# Patient Record
Sex: MALE | Race: OTHER | HISPANIC OR LATINO | Employment: UNEMPLOYED | URBAN - METROPOLITAN AREA
[De-identification: names, ages, dates, MRNs, and addresses within clinical notes are randomized per-mention and may not be internally consistent; named-entity substitution may affect disease eponyms.]

---

## 2022-10-07 ENCOUNTER — HOSPITAL ENCOUNTER (EMERGENCY)
Facility: HOSPITAL | Age: 22
Discharge: HOME | End: 2022-10-07
Attending: EMERGENCY MEDICINE

## 2022-10-07 VITALS
OXYGEN SATURATION: 98 % | SYSTOLIC BLOOD PRESSURE: 140 MMHG | DIASTOLIC BLOOD PRESSURE: 85 MMHG | HEART RATE: 66 BPM | RESPIRATION RATE: 20 BRPM | TEMPERATURE: 98 F

## 2022-10-07 DIAGNOSIS — N20.0 NEPHROLITHIASIS: Primary | ICD-10-CM

## 2022-10-07 LAB
ANION GAP SERPL CALC-SCNC: 10 MMOL/L (ref 8–16)
BASOPHILS # BLD AUTO: 0.04 K/UL (ref 0–0.2)
BASOPHILS NFR BLD: 0.3 % (ref 0–1.9)
BILIRUB UR QL STRIP: NEGATIVE
BUN SERPL-MCNC: 12 MG/DL (ref 6–20)
CALCIUM SERPL-MCNC: 9.6 MG/DL (ref 8.7–10.5)
CHLORIDE SERPL-SCNC: 106 MMOL/L (ref 95–110)
CLARITY UR: CLEAR
CO2 SERPL-SCNC: 25 MMOL/L (ref 23–29)
COLOR UR: YELLOW
CREAT SERPL-MCNC: 0.9 MG/DL (ref 0.5–1.4)
DIFFERENTIAL METHOD: ABNORMAL
EOSINOPHIL # BLD AUTO: 0.2 K/UL (ref 0–0.5)
EOSINOPHIL NFR BLD: 1.8 % (ref 0–8)
ERYTHROCYTE [DISTWIDTH] IN BLOOD BY AUTOMATED COUNT: 13.4 % (ref 11.5–14.5)
EST. GFR  (NO RACE VARIABLE): >60 ML/MIN/1.73 M^2
GLUCOSE SERPL-MCNC: 109 MG/DL (ref 70–110)
GLUCOSE UR QL STRIP: NEGATIVE
HCT VFR BLD AUTO: 46 % (ref 40–54)
HGB BLD-MCNC: 15.1 G/DL (ref 14–18)
HGB UR QL STRIP: ABNORMAL
IMM GRANULOCYTES # BLD AUTO: 0.05 K/UL (ref 0–0.04)
IMM GRANULOCYTES NFR BLD AUTO: 0.4 % (ref 0–0.5)
KETONES UR QL STRIP: NEGATIVE
LEUKOCYTE ESTERASE UR QL STRIP: NEGATIVE
LYMPHOCYTES # BLD AUTO: 2 K/UL (ref 1–4.8)
LYMPHOCYTES NFR BLD: 15 % (ref 18–48)
MCH RBC QN AUTO: 29.6 PG (ref 27–31)
MCHC RBC AUTO-ENTMCNC: 32.8 G/DL (ref 32–36)
MCV RBC AUTO: 90 FL (ref 82–98)
MONOCYTES # BLD AUTO: 0.7 K/UL (ref 0.3–1)
MONOCYTES NFR BLD: 5.2 % (ref 4–15)
NEUTROPHILS # BLD AUTO: 10.3 K/UL (ref 1.8–7.7)
NEUTROPHILS NFR BLD: 77.3 % (ref 38–73)
NITRITE UR QL STRIP: NEGATIVE
NRBC BLD-RTO: 0 /100 WBC
PH UR STRIP: 6 [PH] (ref 5–8)
PLATELET # BLD AUTO: 366 K/UL (ref 150–450)
PMV BLD AUTO: 10.1 FL (ref 9.2–12.9)
POTASSIUM SERPL-SCNC: 4 MMOL/L (ref 3.5–5.1)
PROT UR QL STRIP: NEGATIVE
RBC # BLD AUTO: 5.1 M/UL (ref 4.6–6.2)
SODIUM SERPL-SCNC: 141 MMOL/L (ref 136–145)
SP GR UR STRIP: 1.02 (ref 1–1.03)
URN SPEC COLLECT METH UR: ABNORMAL
UROBILINOGEN UR STRIP-ACNC: NEGATIVE EU/DL
WBC # BLD AUTO: 13.28 K/UL (ref 3.9–12.7)

## 2022-10-07 PROCEDURE — 85025 COMPLETE CBC W/AUTO DIFF WBC: CPT | Performed by: PHYSICIAN ASSISTANT

## 2022-10-07 PROCEDURE — 63600175 PHARM REV CODE 636 W HCPCS: Performed by: PHYSICIAN ASSISTANT

## 2022-10-07 PROCEDURE — 80048 BASIC METABOLIC PNL TOTAL CA: CPT | Performed by: PHYSICIAN ASSISTANT

## 2022-10-07 PROCEDURE — 96372 THER/PROPH/DIAG INJ SC/IM: CPT | Performed by: PHYSICIAN ASSISTANT

## 2022-10-07 PROCEDURE — 81003 URINALYSIS AUTO W/O SCOPE: CPT | Performed by: PHYSICIAN ASSISTANT

## 2022-10-07 PROCEDURE — 99285 EMERGENCY DEPT VISIT HI MDM: CPT | Mod: 25

## 2022-10-07 RX ORDER — OXYCODONE AND ACETAMINOPHEN 5; 325 MG/1; MG/1
1 TABLET ORAL EVERY 4 HOURS PRN
Qty: 18 TABLET | Refills: 0 | Status: SHIPPED | OUTPATIENT
Start: 2022-10-07

## 2022-10-07 RX ORDER — ONDANSETRON 4 MG/1
4 TABLET, ORALLY DISINTEGRATING ORAL EVERY 6 HOURS PRN
Qty: 12 TABLET | Refills: 0 | Status: SHIPPED | OUTPATIENT
Start: 2022-10-07

## 2022-10-07 RX ORDER — KETOROLAC TROMETHAMINE 30 MG/ML
30 INJECTION, SOLUTION INTRAMUSCULAR; INTRAVENOUS
Status: COMPLETED | OUTPATIENT
Start: 2022-10-07 | End: 2022-10-07

## 2022-10-07 RX ORDER — IBUPROFEN 800 MG/1
800 TABLET ORAL EVERY 6 HOURS PRN
Qty: 20 TABLET | Refills: 0 | Status: SHIPPED | OUTPATIENT
Start: 2022-10-07

## 2022-10-07 RX ADMIN — KETOROLAC TROMETHAMINE 30 MG: 30 INJECTION, SOLUTION INTRAMUSCULAR at 10:10

## 2022-10-07 NOTE — DISCHARGE INSTRUCTIONS
terminar el curso completo de antibióticos. [place] la medicación según lo prescrito. [place] Percocet solo para el dolor irruptivo. No trabaje ni conduzca con izaiah medicamento. Use un colador cada vez que orine.      Es probable que elimine el cálculo por hernández cuenta, keron si tiene problemas, deberá realizar un seguimiento con el urólogo que se le proporcionó.      Regrese a la alie de emergencias de inmediato si tiene fiebre o si tiene un dolor significativo que no se shannan con los medicamentos que le estoy proporcionando.       finish full course of antibiotics.  Take medication as prescribed.  Take Percocet only for breakthrough pain.  Do not work or drive with this medicine.  Use a strainer every time you urinate.      You will likely pass the stone on your own but if you are having trouble you will need to follow-up with urologist provided to you.      Return to the emergency room immediately if you develop a fever or if you have significant pain that is not alleviated by the  Medications Im  providing you

## 2022-10-07 NOTE — ED PROVIDER NOTES
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  Patient reports sudden onset left-sided back discomfort that radiated to the abdomen.  Reports that the pain has been intermittent in nature.  Denies any paresthesia weakness.  No pain radiating to the lower extremities.  No injury, trauma or fall.  Patient states that he was remodeling a but does not recall any injury.  Denies any nausea, vomiting or diarrhea.  Denies any fevers or chills.  No prior back surgery.  Took ibuprofen earlier today. no history of kidney stones.    The history is provided by the patient. A  was used (Allison Simpson- nursing staff).   Review of patient's allergies indicates:  No Known Allergies  No past medical history on file.  No past surgical history on file.  No family history on file.     Review of Systems   Constitutional:  Negative for chills and fever.   HENT:  Negative for congestion.    Eyes:  Negative for visual disturbance.   Respiratory:  Negative for shortness of breath.    Cardiovascular:  Negative for chest pain.   Gastrointestinal:  Negative for nausea and vomiting.   Genitourinary:  Positive for flank pain. Negative for dysuria.   Musculoskeletal:  Positive for back pain. Negative for myalgias.   Skin:  Negative for rash.   Allergic/Immunologic: Negative for immunocompromised state.   Neurological:  Negative for weakness and numbness.   Hematological:  Does not bruise/bleed easily.   Psychiatric/Behavioral:  Negative for confusion.      Physical Exam     Initial Vitals [10/07/22 0947]   BP Pulse Resp Temp SpO2   (!) 165/94 66 16 98.2 °F (36.8 °C) 98 %      MAP       --         Physical Exam    Vitals  reviewed.  Constitutional: He appears well-developed and well-nourished. He is not diaphoretic. No distress.   HENT:   Head: Normocephalic and atraumatic.   Eyes: Conjunctivae and EOM are normal.   Neck: Neck supple.   Pulmonary/Chest: No respiratory distress.   Abdominal: Abdomen is soft. There is abdominal tenderness (left flank).   No right CVA tenderness.  No left CVA tenderness.   Musculoskeletal:         General: Normal range of motion.      Cervical back: Normal and neck supple.      Thoracic back: Normal.      Lumbar back: Normal.     Neurological: He is alert and oriented to person, place, and time.   Skin: Skin is warm.       ED Course   Procedures  Labs Reviewed   URINALYSIS, REFLEX TO URINE CULTURE - Abnormal; Notable for the following components:       Result Value    Occult Blood UA Trace (*)     All other components within normal limits    Narrative:     Specimen Source->Urine   CBC W/ AUTO DIFFERENTIAL - Abnormal; Notable for the following components:    WBC 13.28 (*)     Gran # (ANC) 10.3 (*)     Immature Grans (Abs) 0.05 (*)     Gran % 77.3 (*)     Lymph % 15.0 (*)     All other components within normal limits   BASIC METABOLIC PANEL          Imaging Results              CT Renal Stone Study ABD Pelvis WO (Final result)  Result time 10/07/22 12:05:25      Final result by Navneet Ortiz Jr., MD (10/07/22 12:05:25)                   Impression:      5 mm left UVJ stone with associated mild obstructive and inflammatory changes of the left renal collecting system and kidney.    Hepatic steatosis      Electronically signed by: Navneet Turner Jr  Date:    10/07/2022  Time:    12:05               Narrative:    EXAMINATION:  CT RENAL STONE STUDY ABD PELVIS WO    CLINICAL HISTORY:  Flank pain, kidney stone suspected;Nephrolithiasis, uncomplicated;    TECHNIQUE:  Low dose axial images, sagittal and coronal reformations were obtained from the lung bases to the pubic symphysis.  Contrast was not  administered.    COMPARISON:  None    FINDINGS:  Lung Bases: Unremarkable.    Kidneys/ Ureters: There is a 5 x 5 mm left UVJ stone with mild associated hydro ureteral nephrosis and inflammatory stranding around the distal left pelvic ureter.  Low-density right renal lesion measuring approximately 1.5 cm likely a cyst.  No right renal stones or hydronephrosis.    Liver: Enlarged and fatty infiltrated.    Gallbladder: No calcified gallstones.    Bile Ducts: No evidence of dilated ducts.    Pancreas: No mass or peripancreatic fat stranding.    Spleen: Unremarkable.    Adrenals: Unremarkable.    Pelvic organs: Unremarkable.    GI Tract/Mesentery: No evidence of bowel obstruction or inflammation.    Retroperitoneum: No significant adenopathy.    Vasculature: No significant atherosclerosis or aneurysm.    Bones: Unremarkable.                                       Medications   ketorolac injection 30 mg (30 mg Intramuscular Given 10/7/22 1008)           APC / Resident Notes:   Patient seen in the ER promptly upon arrival.  He is afebrile, no his.  Physical examination reveals tenderness on palpation to left flank left lower abdomen.  No tenderness to the back.  No spinous process tenderness on exam.  Laboratory studies obtained.  Urinalysis shows occult blood.    Findings concerning for possible nephrolithiasis.  Will obtain CT of the abdomen and pelvis.  Lab work was found to be unremarkable.      ED Course as of 10/07/22 2033   Fri Oct 07, 2022   1251 Will prescribe home on Percocet, ibuprofen and Zofran to use as directed.  Will advised to follow-up with urology if unable to pass the stone.  Given strict return precautions ED which patient is agreeable to.  He is otherwise stable for discharge and close follow-up at this time.    Disclaimer: This note has been generated using voice-recognition software. There may be typographical errors that have been missed during proof-reading.     [AJ]   1251 CT concerning for  calculus 5 mm at the UVJ.    Lab work was found to be unremarkable.  Mild leukocytosis of 13,000 .  Chemistries found to be unremarkable.  No evidence of UTI [AJ]      ED Course User Index  [AJ] Cristy Rajan PA-C                 Clinical Impression:   Final diagnoses:  [N20.0] Nephrolithiasis (Primary)      ED Disposition Condition    Discharge Stable          ED Prescriptions       Medication Sig Dispense Start Date End Date Auth. Provider    oxyCODONE-acetaminophen (PERCOCET) 5-325 mg per tablet Take 1 tablet by mouth every 4 (four) hours as needed for Pain. 18 tablet 10/7/2022 -- Cristy Rajan PA-C    ibuprofen (ADVIL,MOTRIN) 800 MG tablet Take 1 tablet (800 mg total) by mouth every 6 (six) hours as needed for Pain. 20 tablet 10/7/2022 -- Cristy Rajan PA-C    ondansetron (ZOFRAN-ODT) 4 MG TbDL Take 1 tablet (4 mg total) by mouth every 6 (six) hours as needed. 12 tablet 10/7/2022 -- Cristy Rajan PA-C          Follow-up Information       Follow up With Specialties Details Why Contact Info Additional Information    [facility]   [address], [address]  [facility] [address] [phone] Please park in [address] or [address] and use [address]. Take Medical Office Building elevators.             Cristy Rajan PA-C  10/07/22 2033

## 2023-03-24 ENCOUNTER — HOSPITAL ENCOUNTER (EMERGENCY)
Facility: HOSPITAL | Age: 23
Discharge: HOME | End: 2023-03-24
Attending: EMERGENCY MEDICINE

## 2023-03-24 VITALS
TEMPERATURE: 98 F | OXYGEN SATURATION: 98 % | WEIGHT: 219.56 LBS | RESPIRATION RATE: 18 BRPM | SYSTOLIC BLOOD PRESSURE: 148 MMHG | HEART RATE: 94 BPM | HEIGHT: 65 IN | BODY MASS INDEX: 36.58 KG/M2 | DIASTOLIC BLOOD PRESSURE: 83 MMHG

## 2023-03-24 DIAGNOSIS — S80.02XA CONTUSION OF LEFT KNEE, INITIAL ENCOUNTER: Primary | ICD-10-CM

## 2023-03-24 DIAGNOSIS — M25.562 LEFT KNEE PAIN: ICD-10-CM

## 2023-03-24 PROCEDURE — 99283 EMERGENCY DEPT VISIT LOW MDM: CPT

## 2023-03-24 RX ORDER — NAPROXEN 500 MG/1
500 TABLET ORAL 2 TIMES DAILY WITH MEALS
Qty: 30 TABLET | Refills: 0 | Status: SHIPPED | OUTPATIENT
Start: 2023-03-24

## 2023-03-24 NOTE — DISCHARGE INSTRUCTIONS

## 2023-03-24 NOTE — ED PROVIDER NOTES
Encounter Date: 3/24/2023       History     Chief Complaint   Patient presents with    Knee Pain     Knee pain x 2 days after mechanical fall. Last tylenol was last night.      23-year-old male presents to ED with concern of right-sided knee pain.  Patient states he was running yesterday when he stumbled and fell, causing him to twist his left knee.  Since fall he has been unable to weight bear onto left lower extremity without significant amount of pain that he describes as sharp, nonradiating, rated as severity 8/10.  No numbness or focal weakness.  No other reported injuries.  No other acute complaints at this time.    The history is provided by the patient. The history is limited by a language barrier. A  was used ([employer]:  [id number]).   Review of patient's allergies indicates:  No Known Allergies  No past medical history on file.  No past surgical history on file.  No family history on file.     Review of Systems   Musculoskeletal:  Positive for arthralgias and gait problem. Negative for back pain and neck pain.   Skin:  Negative for wound.   Neurological:  Negative for weakness and numbness.     Physical Exam     Initial Vitals [03/24/23 1216]   BP Pulse Resp Temp SpO2   (!) 148/83 94 18 97.6 °F (36.4 °C) 98 %      MAP       --         Physical Exam    Nursing note and vitals reviewed.  Constitutional: Vital signs are normal. He appears well-developed and well-nourished. He is cooperative. He does not have a sickly appearance. He does not appear ill. No distress.   HENT:   Head: Normocephalic and atraumatic.   Eyes: EOM are normal.   Neck:   Normal range of motion.  Musculoskeletal:      Cervical back: Normal range of motion.      Comments: Left knee tenderness near medial joint line.  No obvious physical or palpable deformities.  No significant swelling or large effusion.  No overlying skin changes or wounds.  No posterior calf tenderness.  Full ROM with no crepitus.  Distal sensation intact.   DP pulse intact.     Neurological: He is alert and oriented to person, place, and time. GCS eye subscore is 4. GCS verbal subscore is 5. GCS motor subscore is 6.   Psychiatric: He has a normal mood and affect. His speech is normal and behavior is normal.       ED Course   Procedures  Labs Reviewed - No data to display       Imaging Results              X-Ray Knee 3 View Left (Final result)  Result time 03/24/23 13:34:34      Final result by Vasiliy Medina III, MD (03/24/23 13:34:34)                   Impression:      Small joint effusion.      Electronically signed by: Vasiliy Medina MD  Date:    03/24/2023  Time:    13:34               Narrative:    EXAMINATION:  XR KNEE 3 VIEW LEFT    CLINICAL HISTORY:  Pain in left knee    FINDINGS:  Three views knee left: There is no fracture dislocation or bone destruction seen.  There is a small joint effusion.                                       Medications - No data to display  Medical Decision Making:   Initial Assessment:   Patient presents with concern of left-sided knee pain since fall that occurred yesterday while running.  No numbness or focal weakness.  No other reported injuries.  Afebrile with vitals grossly unremarkable, noting mildly hypertensive.  Patient otherwise resting on bed and in no apparent distress.  Tenderness over medial aspect of left knee with no obvious physical or palpable deformities.  Neurovascular intact.  Differential Diagnosis:   Strain, sprain, dislocation, fracture, ligament injury, meniscus injury  ED Management:  X-ray left knee    Images of left knee showing small joint effusion but no acute bony abnormalities or dislocation.  Results discussed with patient.  Will plan to continue with conservative care.  He will continue his home knee brace as instructed.  Supplied with crutches in ED along with prescription for Motrin.  Encouraged activities movements only as tolerated with close PCP follow-up.  ED return precautions were discussed.   Patient states his understanding and agrees with plan.                        Clinical Impression:   Final diagnoses:  [M25.562] Left knee pain  [S80.02XA] Contusion of left knee, initial encounter (Primary)        ED Disposition Condition    Discharge Stable          ED Prescriptions       Medication Sig Dispense Start Date End Date Auth. Provider    naproxen (NAPROSYN) 500 MG tablet Take 1 tablet (500 mg total) by mouth 2 (two) times daily with meals. 30 tablet 3/24/2023 -- Juma Ricardo PA-C          Follow-up Information       Follow up With Specialties Details Why Contact Info    Your Doctor                 Juma Ricardo PA-C  03/24/23 8072

## 2023-06-22 ENCOUNTER — HOSPITAL ENCOUNTER (EMERGENCY)
Facility: HOSPITAL | Age: 23
Discharge: HOME | End: 2023-06-22
Attending: EMERGENCY MEDICINE

## 2023-06-22 VITALS
OXYGEN SATURATION: 98 % | HEART RATE: 86 BPM | SYSTOLIC BLOOD PRESSURE: 130 MMHG | DIASTOLIC BLOOD PRESSURE: 63 MMHG | WEIGHT: 220 LBS | RESPIRATION RATE: 18 BRPM | BODY MASS INDEX: 37.1 KG/M2 | TEMPERATURE: 98 F

## 2023-06-22 DIAGNOSIS — T15.92XA FOREIGN BODY OF LEFT EYE, INITIAL ENCOUNTER: Primary | ICD-10-CM

## 2023-06-22 PROCEDURE — 65220 REMOVE FOREIGN BODY FROM EYE: CPT | Mod: LT

## 2023-06-22 PROCEDURE — 63600175 PHARM REV CODE 636 W HCPCS: Performed by: NURSE PRACTITIONER

## 2023-06-22 PROCEDURE — 99284 EMERGENCY DEPT VISIT MOD MDM: CPT

## 2023-06-22 PROCEDURE — 90471 IMMUNIZATION ADMIN: CPT | Performed by: NURSE PRACTITIONER

## 2023-06-22 PROCEDURE — 25000003 PHARM REV CODE 250: Performed by: NURSE PRACTITIONER

## 2023-06-22 PROCEDURE — 90715 TDAP VACCINE 7 YRS/> IM: CPT | Performed by: NURSE PRACTITIONER

## 2023-06-22 RX ORDER — ERYTHROMYCIN 5 MG/G
OINTMENT OPHTHALMIC
Qty: 1 G | Refills: 0 | Status: SHIPPED | OUTPATIENT
Start: 2023-06-22

## 2023-06-22 RX ORDER — ERYTHROMYCIN 5 MG/G
OINTMENT OPHTHALMIC
Status: COMPLETED | OUTPATIENT
Start: 2023-06-22 | End: 2023-06-22

## 2023-06-22 RX ORDER — PROPARACAINE HYDROCHLORIDE 5 MG/ML
1 SOLUTION/ DROPS OPHTHALMIC
Status: COMPLETED | OUTPATIENT
Start: 2023-06-22 | End: 2023-06-22

## 2023-06-22 RX ADMIN — FLUORESCEIN SODIUM 1 EACH: 1 STRIP OPHTHALMIC at 05:06

## 2023-06-22 RX ADMIN — ERYTHROMYCIN 1 INCH: 5 OINTMENT OPHTHALMIC at 06:06

## 2023-06-22 RX ADMIN — PROPARACAINE HYDROCHLORIDE 1 DROP: 5 SOLUTION/ DROPS OPHTHALMIC at 05:06

## 2023-06-22 RX ADMIN — TETANUS TOXOID, REDUCED DIPHTHERIA TOXOID AND ACELLULAR PERTUSSIS VACCINE, ADSORBED 0.5 ML: 5; 2.5; 8; 8; 2.5 SUSPENSION INTRAMUSCULAR at 06:06

## 2023-06-22 NOTE — Clinical Note
"Stevenson Sánchez (Lizandro) was seen and treated in our emergency department on 6/22/2023.  He may return to work on 06/25/2023.       If you have any questions or concerns, please don't hesitate to call.      Eugenie Mendez NP"

## 2023-06-22 NOTE — DISCHARGE INSTRUCTIONS

## 2023-06-22 NOTE — ED PROVIDER NOTES
Encounter Date: 6/22/2023       History     Chief Complaint   Patient presents with    Eye Pain     Pt state a piece of metal flew into his eye, pt states feels like sometime is in her eye. Pt states stabbing type pain to left eye.      23 yr old male presents to the ER with reports of a foreign body to the left eye that occurred yesterday when using a  at work. Pt states he has been having pain and irritation to the eye since. Not up to date on tetanus. No PMH reported.     The history is provided by the patient. The history is limited by a language barrier. A  was used.   Review of patient's allergies indicates:  No Known Allergies  No past medical history on file.  No past surgical history on file.  No family history on file.     Review of Systems   Eyes:  Positive for pain.        Foreign body left eye     Physical Exam     Initial Vitals [06/22/23 1537]   BP Pulse Resp Temp SpO2   130/63 86 18 98.3 °F (36.8 °C) 98 %      MAP       --         Physical Exam    Constitutional: He appears well-developed and well-nourished. He is not diaphoretic. No distress.   HENT:   Head: Normocephalic and atraumatic.   Right Ear: Hearing and tympanic membrane normal.   Left Ear: Hearing and tympanic membrane normal.   Nose: Nose normal.   Mouth/Throat: Uvula is midline, oropharynx is clear and moist and mucous membranes are normal.   Eyes: Lids are normal. Pupils are equal, round, and reactive to light. Foreign body present in the left eye. Left conjunctiva is injected. Right eye exhibits no nystagmus. Left eye exhibits no nystagmus.   Slit lamp exam:       The left eye shows foreign body and fluorescein uptake.       No open globe noted. EOMI.    Cardiovascular:  Normal rate.           Pulmonary/Chest: Effort normal and breath sounds normal. No respiratory distress. He has no wheezes. He has no rhonchi.   Abdominal: Abdomen is soft. There is no abdominal tenderness.   Musculoskeletal:         General:  Normal range of motion.      Cervical back: No rigidity.     Neurological: He is oriented to person, place, and time.   Skin: Skin is warm and dry. No rash noted.   Psychiatric: He has a normal mood and affect. His behavior is normal. Judgment and thought content normal.       ED Course   ED US Eye    Date/Time: 6/22/2023 6:21 PM  Performed by: Jed Wallis MD  Authorized by: Jed Wallis MD     Indication:  Other (foreign body)  Identified structures:  Left  Retinal contour:  Normal  Optic nerve sheath:  Normal  Other:  No retained foreign body noted.  Normal ocular ultrasound:  Left  Charge?:  Yes  Foreign Body    Date/Time: 6/22/2023 6:22 PM  Performed by: Eugenie Mendez NP  Authorized by: Jed Wallis MD   Body area: eye  Location details: left cornea    Anesthesia:  Local Anesthetic: proparacaine drops  Localization method: visualized and magnification  Removal mechanism: moist cotton swab, 27-gauge needle and ophthalmic anabel  Eye examined with fluorescein.  Corneal abrasion size: small  Corneal abrasion location: lateral  Dressing: antibiotic ointment  Depth: embedded  Complexity: complex  1 objects recovered.  Post-procedure assessment: foreign body removed  Patient tolerance: Patient tolerated the procedure well with no immediate complications    Labs Reviewed - No data to display       Imaging Results    None          Medications   proparacaine 0.5 % ophthalmic solution 1 drop (has no administration in time range)   fluorescein ophthalmic strip 1 each (has no administration in time range)   erythromycin 5 mg/gram (0.5 %) ophthalmic ointment (has no administration in time range)   Tdap (BOOSTRIX) vaccine injection 0.5 mL (has no administration in time range)     Medical Decision Making:   Differential Diagnosis:   Differential Diagnosis includes, but is not limited to:  Acute glaucoma, open globe, ocular foreign body, retinal detachment, vitreous hemorrhage, endophthalmitis, traumatic  injury, orbital fracture, corneal abrasion/ulcer, retinal vascular occlusion, optic neuritis, periorbital/orbital cellulitis, hyphema, hypopion, iritis, UV keratitis, subconjunctival hemorrhage, conjunctivitis, blepharitis, chalazion/hordeolum, benign vitreous floaters.            ED Course as of 06/22/23 1826   Thu Jun 22, 202322, 2023 1806 Foreign body noted to the outer corner of the left iris. This is consistent with piece of metal. Cotton tip applicator was used in which a small piece of black suspected metal was removed, however small part still noted to be embedded in the eye. Bur device was used with no success. 18 gauge needle then used with successful removal of foreign body.  [DT]   1808 Dr Wallis at the bedside for ultrasound.  [DT]   1821 There is no retained foreign body noted on ultrasound per Dr Wallis.  [DT]      ED Course User Index  [DT] Eugenie Mendez NP                 Clinical Impression:   Final diagnoses:  [T15.92XA] Foreign body of left eye, initial encounter (Primary)        ED Disposition Condition    Discharge Stable          ED Prescriptions       Medication Sig Dispense Start Date End Date Auth. Provider    erythromycin (ROMYCIN) ophthalmic ointment Place a 1/2 inch ribbon of ointment into the lower eyelid, left three times a day 1 g 6/22/2023 -- Eugenie Mendez NP          Follow-up Information       Follow up With Specialties Details Why Contact Info Additional Information    [facility] Family Medicine Schedule an appointment as soon as possible for a visit in 2 days  [address], [address]  [facility] [address] [phone] Please park in [address] C or D and use [address]. Take Medical Office Bldg. elevators.             Eugenie Mendez NP  06/22/23 1826       Eugenie Mendez NP  06/22/23 1826

## 2023-06-23 ENCOUNTER — TELEPHONE (OUTPATIENT)
Dept: OPHTHALMOLOGY | Facility: CLINIC | Age: 23
End: 2023-06-23

## 2023-06-23 NOTE — TELEPHONE ENCOUNTER
----- Message from Jaqueline Gunter sent at 6/23/2023  9:35 AM CDT -----  Good morning,    The patient have a referral from the emergency department with a diagnosis of Foreign body of left eye, initial encounter/ED Follow Up. Please assist with scheduling the patient.    Thank You